# Patient Record
Sex: FEMALE | Race: OTHER | Employment: UNEMPLOYED | URBAN - METROPOLITAN AREA
[De-identification: names, ages, dates, MRNs, and addresses within clinical notes are randomized per-mention and may not be internally consistent; named-entity substitution may affect disease eponyms.]

---

## 2019-12-27 ENCOUNTER — APPOINTMENT (OUTPATIENT)
Dept: ULTRASOUND IMAGING | Age: 29
End: 2019-12-27
Attending: EMERGENCY MEDICINE
Payer: COMMERCIAL

## 2019-12-27 ENCOUNTER — HOSPITAL ENCOUNTER (EMERGENCY)
Age: 29
Discharge: HOME | End: 2019-12-27
Attending: EMERGENCY MEDICINE
Payer: COMMERCIAL

## 2019-12-27 VITALS
WEIGHT: 103 LBS | DIASTOLIC BLOOD PRESSURE: 70 MMHG | HEIGHT: 62 IN | SYSTOLIC BLOOD PRESSURE: 116 MMHG | TEMPERATURE: 98 F | RESPIRATION RATE: 14 BRPM | OXYGEN SATURATION: 100 % | BODY MASS INDEX: 18.95 KG/M2 | HEART RATE: 82 BPM

## 2019-12-27 DIAGNOSIS — R10.13 EPIGASTRIC PAIN: Primary | ICD-10-CM

## 2019-12-27 DIAGNOSIS — A04.8 H. PYLORI INFECTION: ICD-10-CM

## 2019-12-27 LAB
ALBUMIN SERPL-MCNC: 3.9 G/DL (ref 3.4–5)
ALBUMIN/GLOB SERPL: 1 {RATIO} (ref 1–2)
ALP LIVER SERPL-CCNC: 58 U/L (ref 37–98)
ALT SERPL-CCNC: 27 U/L (ref 13–56)
ANION GAP SERPL CALC-SCNC: 8 MMOL/L (ref 0–18)
AST SERPL-CCNC: 15 U/L (ref 15–37)
BASOPHILS # BLD AUTO: 0.07 X10(3) UL (ref 0–0.2)
BASOPHILS NFR BLD AUTO: 0.9 %
BILIRUB SERPL-MCNC: 0.4 MG/DL (ref 0.1–2)
BILIRUB UR QL STRIP.AUTO: NEGATIVE
BUN BLD-MCNC: 10 MG/DL (ref 7–18)
BUN/CREAT SERPL: 18.2 (ref 10–20)
CALCIUM BLD-MCNC: 8.7 MG/DL (ref 8.5–10.1)
CHLORIDE SERPL-SCNC: 109 MMOL/L (ref 98–112)
CO2 SERPL-SCNC: 25 MMOL/L (ref 21–32)
COLOR UR AUTO: YELLOW
CREAT BLD-MCNC: 0.55 MG/DL (ref 0.55–1.02)
DEPRECATED RDW RBC AUTO: 41.1 FL (ref 35.1–46.3)
EOSINOPHIL # BLD AUTO: 0.07 X10(3) UL (ref 0–0.7)
EOSINOPHIL NFR BLD AUTO: 0.9 %
ERYTHROCYTE [DISTWIDTH] IN BLOOD BY AUTOMATED COUNT: 12.5 % (ref 11–15)
GLOBULIN PLAS-MCNC: 3.9 G/DL (ref 2.8–4.4)
GLUCOSE BLD-MCNC: 85 MG/DL (ref 70–99)
GLUCOSE UR STRIP.AUTO-MCNC: NEGATIVE MG/DL
HCT VFR BLD AUTO: 42.6 % (ref 35–48)
HGB BLD-MCNC: 14.3 G/DL (ref 12–16)
IMM GRANULOCYTES # BLD AUTO: 0.01 X10(3) UL (ref 0–1)
IMM GRANULOCYTES NFR BLD: 0.1 %
KETONES UR STRIP.AUTO-MCNC: NEGATIVE MG/DL
LEUKOCYTE ESTERASE UR QL STRIP.AUTO: NEGATIVE
LIPASE SERPL-CCNC: 174 U/L (ref 73–393)
LYMPHOCYTES # BLD AUTO: 2.96 X10(3) UL (ref 1–4)
LYMPHOCYTES NFR BLD AUTO: 37.6 %
M PROTEIN MFR SERPL ELPH: 7.8 G/DL (ref 6.4–8.2)
MCH RBC QN AUTO: 30.4 PG (ref 26–34)
MCHC RBC AUTO-ENTMCNC: 33.6 G/DL (ref 31–37)
MCV RBC AUTO: 90.4 FL (ref 80–100)
MONOCYTES # BLD AUTO: 0.55 X10(3) UL (ref 0.1–1)
MONOCYTES NFR BLD AUTO: 7 %
NEUTROPHILS # BLD AUTO: 4.21 X10 (3) UL (ref 1.5–7.7)
NEUTROPHILS # BLD AUTO: 4.21 X10(3) UL (ref 1.5–7.7)
NEUTROPHILS NFR BLD AUTO: 53.5 %
NITRITE UR QL STRIP.AUTO: NEGATIVE
OSMOLALITY SERPL CALC.SUM OF ELEC: 292 MOSM/KG (ref 275–295)
PH UR STRIP.AUTO: 7.5 [PH] (ref 4.5–8)
PLATELET # BLD AUTO: 228 10(3)UL (ref 150–450)
POCT URINE PREGNANCY: NEGATIVE
POTASSIUM SERPL-SCNC: 3.6 MMOL/L (ref 3.5–5.1)
PROCEDURE CONTROL: NORMAL
PROT UR STRIP.AUTO-MCNC: NEGATIVE MG/DL
RBC # BLD AUTO: 4.71 X10(6)UL (ref 3.8–5.3)
RBC UR QL AUTO: NEGATIVE
SODIUM SERPL-SCNC: 142 MMOL/L (ref 136–145)
SP GR UR STRIP.AUTO: 1.01 (ref 1–1.03)
UROBILINOGEN UR STRIP.AUTO-MCNC: 0.2 MG/DL
WBC # BLD AUTO: 7.9 X10(3) UL (ref 4–11)

## 2019-12-27 PROCEDURE — 99284 EMERGENCY DEPT VISIT MOD MDM: CPT

## 2019-12-27 PROCEDURE — 83690 ASSAY OF LIPASE: CPT | Performed by: EMERGENCY MEDICINE

## 2019-12-27 PROCEDURE — 81003 URINALYSIS AUTO W/O SCOPE: CPT | Performed by: EMERGENCY MEDICINE

## 2019-12-27 PROCEDURE — 80053 COMPREHEN METABOLIC PANEL: CPT | Performed by: EMERGENCY MEDICINE

## 2019-12-27 PROCEDURE — 85025 COMPLETE CBC W/AUTO DIFF WBC: CPT | Performed by: EMERGENCY MEDICINE

## 2019-12-27 PROCEDURE — 96361 HYDRATE IV INFUSION ADD-ON: CPT

## 2019-12-27 PROCEDURE — 76700 US EXAM ABDOM COMPLETE: CPT | Performed by: EMERGENCY MEDICINE

## 2019-12-27 PROCEDURE — 96374 THER/PROPH/DIAG INJ IV PUSH: CPT

## 2019-12-27 PROCEDURE — 81025 URINE PREGNANCY TEST: CPT

## 2019-12-27 RX ORDER — CLARITHROMYCIN 500 MG/1
500 TABLET, COATED ORAL 2 TIMES DAILY
Qty: 28 TABLET | Refills: 0 | Status: SHIPPED | OUTPATIENT
Start: 2019-12-27 | End: 2020-01-10

## 2019-12-27 RX ORDER — ONDANSETRON 2 MG/ML
4 INJECTION INTRAMUSCULAR; INTRAVENOUS ONCE
Status: COMPLETED | OUTPATIENT
Start: 2019-12-27 | End: 2019-12-27

## 2019-12-27 RX ORDER — AMOXICILLIN 500 MG/1
1000 TABLET, FILM COATED ORAL 2 TIMES DAILY
Qty: 30 TABLET | Refills: 0 | Status: SHIPPED | OUTPATIENT
Start: 2019-12-27 | End: 2020-01-10

## 2019-12-28 NOTE — ED INITIAL ASSESSMENT (HPI)
Pt here for RUQ abd pain worse since yesterday. Pt was sent from physicians immediate care to r/o gallbladder but tested positive for h.pylori. Pt reports n/v and worsening pain after eating.

## 2019-12-28 NOTE — ED PROVIDER NOTES
Patient Seen in: [facility] In [place]      History   Patient presents with:  Abdomen/Flank Pain    Stated Complaint: abdominal pain    HPI    Patient is a 49-year-old female who states that for the past year she has had intermittent meningismus. LUNGS: Lungs clear to auscultation bilaterally. CARDIOVASCULAR: + S1-S2, regular rate and rhythm, no murmurs. BACK: No CVA tenderness, no midline bony tenderness.   ABDOMEN: + Bowel sounds, soft, moderate tenderness right upper quadrant, non infection    Disposition:  Discharge  12/27/2019  9:40 pm    Follow-up:  Delmy Fleming, [address] [facility]  [phone]    In 3 days          Medications Prescribed:  Current Discharge Medication List    START taking these

## 2021-11-16 ENCOUNTER — APPOINTMENT (OUTPATIENT)
Dept: ULTRASOUND IMAGING | Age: 31
End: 2021-11-16
Attending: EMERGENCY MEDICINE
Payer: MEDICAID

## 2021-11-16 ENCOUNTER — HOSPITAL ENCOUNTER (EMERGENCY)
Age: 31
Discharge: HOME | End: 2021-11-17
Attending: EMERGENCY MEDICINE
Payer: MEDICAID

## 2021-11-16 DIAGNOSIS — R10.30 LOWER ABDOMINAL PAIN: Primary | ICD-10-CM

## 2021-11-16 PROCEDURE — 81003 URINALYSIS AUTO W/O SCOPE: CPT

## 2021-11-16 PROCEDURE — 81025 URINE PREGNANCY TEST: CPT

## 2021-11-16 PROCEDURE — 87591 N.GONORRHOEAE DNA AMP PROB: CPT | Performed by: EMERGENCY MEDICINE

## 2021-11-16 PROCEDURE — 99285 EMERGENCY DEPT VISIT HI MDM: CPT

## 2021-11-16 PROCEDURE — 76856 US EXAM PELVIC COMPLETE: CPT | Performed by: EMERGENCY MEDICINE

## 2021-11-16 PROCEDURE — 87510 GARDNER VAG DNA DIR PROBE: CPT | Performed by: EMERGENCY MEDICINE

## 2021-11-16 PROCEDURE — 87480 CANDIDA DNA DIR PROBE: CPT | Performed by: EMERGENCY MEDICINE

## 2021-11-16 PROCEDURE — 80053 COMPREHEN METABOLIC PANEL: CPT | Performed by: EMERGENCY MEDICINE

## 2021-11-16 PROCEDURE — 83690 ASSAY OF LIPASE: CPT | Performed by: EMERGENCY MEDICINE

## 2021-11-16 PROCEDURE — 76830 TRANSVAGINAL US NON-OB: CPT | Performed by: EMERGENCY MEDICINE

## 2021-11-16 PROCEDURE — 96361 HYDRATE IV INFUSION ADD-ON: CPT

## 2021-11-16 PROCEDURE — 85025 COMPLETE CBC W/AUTO DIFF WBC: CPT | Performed by: EMERGENCY MEDICINE

## 2021-11-16 PROCEDURE — 87491 CHLMYD TRACH DNA AMP PROBE: CPT | Performed by: EMERGENCY MEDICINE

## 2021-11-16 PROCEDURE — 99284 EMERGENCY DEPT VISIT MOD MDM: CPT

## 2021-11-16 PROCEDURE — 93975 VASCULAR STUDY: CPT | Performed by: EMERGENCY MEDICINE

## 2021-11-16 PROCEDURE — 81003 URINALYSIS AUTO W/O SCOPE: CPT | Performed by: EMERGENCY MEDICINE

## 2021-11-16 PROCEDURE — 96374 THER/PROPH/DIAG INJ IV PUSH: CPT

## 2021-11-16 PROCEDURE — 87660 TRICHOMONAS VAGIN DIR PROBE: CPT | Performed by: EMERGENCY MEDICINE

## 2021-11-16 RX ORDER — SODIUM CHLORIDE 9 MG/ML
125 INJECTION, SOLUTION INTRAVENOUS CONTINUOUS
Status: DISCONTINUED | OUTPATIENT
Start: 2021-11-16 | End: 2021-11-17

## 2021-11-16 RX ORDER — HYDROCODONE BITARTRATE AND ACETAMINOPHEN 5; 325 MG/1; MG/1
1-2 TABLET ORAL EVERY 6 HOURS PRN
Qty: 10 TABLET | Refills: 0 | Status: SHIPPED | OUTPATIENT
Start: 2021-11-16 | End: 2021-11-23

## 2021-11-16 RX ORDER — KETOROLAC TROMETHAMINE 15 MG/ML
15 INJECTION, SOLUTION INTRAMUSCULAR; INTRAVENOUS ONCE
Status: COMPLETED | OUTPATIENT
Start: 2021-11-16 | End: 2021-11-16

## 2021-11-17 VITALS
DIASTOLIC BLOOD PRESSURE: 70 MMHG | SYSTOLIC BLOOD PRESSURE: 100 MMHG | WEIGHT: 108 LBS | HEIGHT: 62 IN | TEMPERATURE: 98 F | BODY MASS INDEX: 19.88 KG/M2 | OXYGEN SATURATION: 99 % | RESPIRATION RATE: 16 BRPM | HEART RATE: 84 BPM

## 2021-11-17 NOTE — ED PROVIDER NOTES
Patient Seen in: [facility] In [place]      History   Patient presents with:  Abdomen/Flank Pain    Stated Complaint: abd pain    Subjective:   HPI  Patient is a 41-year-old female G3, P4 twin gestation.   Patient delivered  1 Physical Exam  GENERAL: Patient resting  on the cart in no acute distress. HEENT: Extraocular muscles intact, pupils equal round reactive to light , neck supple, no meningismus. LUNGS: Lungs clear to auscultation bilaterally.   CARDIOVASCULAR: + S Clinical Impression:  Lower abdominal pain  (primary encounter diagnosis)     Disposition:  Discharge  11/17/2021  2:01 am    Follow-up:  Kaylen Melo, [address] [id number]    In 2 days      Jacqueline Tomlinson DO

## 2021-11-17 NOTE — ED INITIAL ASSESSMENT (HPI)
Pt to ed from home with c/o abd pain, bloated feeling, frequency, urgency feeling,,nausea, sx present 5 days.

## 2025-04-03 ENCOUNTER — HOSPITAL ENCOUNTER (EMERGENCY)
Age: 35
Discharge: HOME | End: 2025-04-03
Attending: EMERGENCY MEDICINE
Payer: COMMERCIAL

## 2025-04-03 ENCOUNTER — APPOINTMENT (OUTPATIENT)
Dept: ULTRASOUND IMAGING | Age: 35
End: 2025-04-03
Attending: PHYSICIAN ASSISTANT
Payer: COMMERCIAL

## 2025-04-03 VITALS
HEIGHT: 62 IN | TEMPERATURE: 98 F | DIASTOLIC BLOOD PRESSURE: 76 MMHG | HEART RATE: 72 BPM | RESPIRATION RATE: 12 BRPM | SYSTOLIC BLOOD PRESSURE: 110 MMHG | BODY MASS INDEX: 20.06 KG/M2 | WEIGHT: 109 LBS | OXYGEN SATURATION: 100 %

## 2025-04-03 DIAGNOSIS — N83.209 HEMORRHAGIC CYST OF OVARY: Primary | ICD-10-CM

## 2025-04-03 LAB
ALBUMIN SERPL-MCNC: 4.9 G/DL (ref 3.2–4.8)
ALBUMIN/GLOB SERPL: 1.7 {RATIO} (ref 1–2)
ALP LIVER SERPL-CCNC: 49 U/L
ALT SERPL-CCNC: 14 U/L
ANION GAP SERPL CALC-SCNC: 8 MMOL/L (ref 0–18)
AST SERPL-CCNC: 17 U/L (ref ?–34)
BASOPHILS # BLD AUTO: 0.05 X10(3) UL (ref 0–0.2)
BASOPHILS NFR BLD AUTO: 0.6 %
BILIRUB SERPL-MCNC: 1 MG/DL (ref 0.3–1.2)
BILIRUB UR QL STRIP.AUTO: NEGATIVE
BUN BLD-MCNC: 10 MG/DL (ref 9–23)
CALCIUM BLD-MCNC: 9.9 MG/DL (ref 8.7–10.6)
CHLORIDE SERPL-SCNC: 106 MMOL/L (ref 98–112)
CLARITY UR REFRACT.AUTO: CLEAR
CO2 SERPL-SCNC: 27 MMOL/L (ref 21–32)
COLOR UR AUTO: YELLOW
CREAT BLD-MCNC: 0.73 MG/DL
EGFRCR SERPLBLD CKD-EPI 2021: 109 ML/MIN/1.73M2 (ref 60–?)
EOSINOPHIL # BLD AUTO: 0.02 X10(3) UL (ref 0–0.7)
EOSINOPHIL NFR BLD AUTO: 0.2 %
ERYTHROCYTE [DISTWIDTH] IN BLOOD BY AUTOMATED COUNT: 12.2 %
GLOBULIN PLAS-MCNC: 2.9 G/DL (ref 2–3.5)
GLUCOSE BLD-MCNC: 93 MG/DL (ref 70–99)
GLUCOSE UR STRIP.AUTO-MCNC: NEGATIVE MG/DL
HCT VFR BLD AUTO: 42.3 %
HGB BLD-MCNC: 14.6 G/DL
IMM GRANULOCYTES # BLD AUTO: 0.01 X10(3) UL (ref 0–1)
IMM GRANULOCYTES NFR BLD: 0.1 %
KETONES UR STRIP.AUTO-MCNC: NEGATIVE MG/DL
LEUKOCYTE ESTERASE UR QL STRIP.AUTO: NEGATIVE
LYMPHOCYTES # BLD AUTO: 2.28 X10(3) UL (ref 1–4)
LYMPHOCYTES NFR BLD AUTO: 28.1 %
MCH RBC QN AUTO: 32.2 PG (ref 26–34)
MCHC RBC AUTO-ENTMCNC: 34.5 G/DL (ref 31–37)
MCV RBC AUTO: 93.2 FL
MONOCYTES # BLD AUTO: 0.55 X10(3) UL (ref 0.1–1)
MONOCYTES NFR BLD AUTO: 6.8 %
NEUTROPHILS # BLD AUTO: 5.19 X10 (3) UL (ref 1.5–7.7)
NEUTROPHILS # BLD AUTO: 5.19 X10(3) UL (ref 1.5–7.7)
NEUTROPHILS NFR BLD AUTO: 64.2 %
NITRITE UR QL STRIP.AUTO: NEGATIVE
OSMOLALITY SERPL CALC.SUM OF ELEC: 291 MOSM/KG (ref 275–295)
PH UR STRIP.AUTO: 5.5 [PH] (ref 5–8)
PLATELET # BLD AUTO: 231 10(3)UL (ref 150–450)
POTASSIUM SERPL-SCNC: 3.6 MMOL/L (ref 3.5–5.1)
PROT SERPL-MCNC: 7.8 G/DL (ref 5.7–8.2)
PROT UR STRIP.AUTO-MCNC: NEGATIVE MG/DL
RBC # BLD AUTO: 4.54 X10(6)UL
RBC UR QL AUTO: NEGATIVE
SODIUM SERPL-SCNC: 141 MMOL/L (ref 136–145)
SP GR UR STRIP.AUTO: <=1.005 (ref 1–1.03)
UROBILINOGEN UR STRIP.AUTO-MCNC: 0.2 MG/DL
WBC # BLD AUTO: 8.1 X10(3) UL (ref 4–11)

## 2025-04-03 PROCEDURE — 85025 COMPLETE CBC W/AUTO DIFF WBC: CPT | Performed by: PHYSICIAN ASSISTANT

## 2025-04-03 PROCEDURE — 80053 COMPREHEN METABOLIC PANEL: CPT | Performed by: PHYSICIAN ASSISTANT

## 2025-04-03 PROCEDURE — 96374 THER/PROPH/DIAG INJ IV PUSH: CPT

## 2025-04-03 PROCEDURE — 76856 US EXAM PELVIC COMPLETE: CPT | Performed by: PHYSICIAN ASSISTANT

## 2025-04-03 PROCEDURE — 99284 EMERGENCY DEPT VISIT MOD MDM: CPT

## 2025-04-03 PROCEDURE — 76830 TRANSVAGINAL US NON-OB: CPT | Performed by: PHYSICIAN ASSISTANT

## 2025-04-03 PROCEDURE — 81003 URINALYSIS AUTO W/O SCOPE: CPT | Performed by: EMERGENCY MEDICINE

## 2025-04-03 RX ORDER — KETOROLAC TROMETHAMINE 15 MG/ML
15 INJECTION, SOLUTION INTRAMUSCULAR; INTRAVENOUS ONCE
Status: COMPLETED | OUTPATIENT
Start: 2025-04-03 | End: 2025-04-03

## 2025-04-03 RX ORDER — GABAPENTIN 100 MG/1
100 CAPSULE ORAL DAILY
Qty: 30 CAPSULE | Refills: 0 | Status: SHIPPED | OUTPATIENT
Start: 2025-04-03

## 2025-04-03 NOTE — ED PROVIDER NOTES
Patient Seen in: [facility] In [place]      History     Chief Complaint   Patient presents with    Abdomen/Flank Pain     Stated Complaint: abd pain- lower pelvis    Subjective:   HPI    34-year-old female who presents to the ER for right lower quadrant abdominal pain That started yesterday and worsening today.  States pains occasionally radiates over to her suprapubic area as well into her lower back and rectal area intermittently.  Denies any other associated symptoms such as nausea, vomiting, diarrhea, constipation, fever, urinary symptoms.  She reports past surgical history of hysterectomy from endometriosis as well as left oophorectomy due to cysts as well as appendectomy.  She has not taken anything for the pain prior to arrival.  No other complaints.    Objective:     Past Medical History:    History of cervical cancer              Past Surgical History:   Procedure Laterality Date    Appendectomy            x 2    Tonsillectomy      Total abdom hysterectomy                  Social History     Socioeconomic History    Marital status: Single   Tobacco Use    Smoking status: Former    Smokeless tobacco: Never   Vaping Use    Vaping status: Never Used   Substance and Sexual Activity    Alcohol use: No    Drug use: No     Social Drivers of Health      Received from [facility]                  Physical Exam     ED Triage Vitals [25 0955]   /90   Pulse 109   Resp 18   Temp 98.3 °F (36.8 °C)   Temp src Temporal   SpO2 100 %   O2 Device None (Room air)       Current Vitals:   Vital Signs  BP: 110/76  Pulse: 72  Resp: 12  Temp: 98.3 °F (36.8 °C)  Temp src: Temporal    Oxygen Therapy  SpO2: 100 %  O2 Device: None (Room air)        Physical Exam  GENERAL APPEARANCE:  AxOx4, generally well-appearing, no acute distress.  HEENT:  NC, AT. MMM. EOMI, clear conjunctiva, oropharynx clear.  NECK:  Supple without lymphadenopathy.  No stiffness or restricted ROM.  HEART:   Normal rate and regular rhythm, normal S1/S1, no m/r/g  LUNGS:  CTAB, moving air well. No crackles or wheezes are heard.  ABDOMEN:  Soft, right lower quadrant and suprapubic tenderness, nondistended with good bowel sounds heard.  BACK: No CVAT, no obvious deformity.  EXTREMITIES:  Without cyanosis, clubbing or edema.  NEUROLOGICAL:  Grossly nonfocal. Alert and oriented, moving all 4 extremities. CN not formally tested but appear grossly intact. Observed to ambulate with normal gait.  Skin:  Warm and dry without any rash.        ED Course     Labs Reviewed   COMP METABOLIC PANEL (14) - Abnormal; Notable for the following components:       Result Value    Albumin 4.9 (*)     All other components within normal limits   URINALYSIS, ROUTINE   CBC WITH DIFFERENTIAL WITH PLATELET   RAINBOW DRAW LAVENDER   RAINBOW DRAW LIGHT GREEN            US PELVIS (TRANSABDOMINAL AND TRANSVAGINAL) (CPT=76856/90052)    Result Date: 4/3/2025  PROCEDURE:  US PELVIS (TRANSABDOMINAL AND TRANSVAGINAL) (CPT=76856/28288)  COMPARISON:  [facility], US PELVIS EV W DOPPLER (CPT=76856/99274/05696), 11/16/2021, 10:26 PM.  INDICATIONS:  abd pain- lower pelvis  TECHNIQUE:  Pelvic ultrasound using transabdominal and endovaginal technique.  Transvaginal ultrasound was used to better evaluate adnexal and endometrial detail.  PATIENT STATED HISTORY: (As transcribed by Technologist)  Right pelvic pain since yesterday.    FINDINGS:            UTERUS:  Patient is status post hysterectomy.  No evidence of mass lesion within the hysterectomy bed.  RIGHT OVARY:  3.4 x 2.3 x 3.5 cm   The right ovary appears normal in size, shape, and echogenicity. No significant masses are identified.  Several physiologic follicles are noted with the largest being isoechoic with a few internal septations measuring 16 x 14 x 13 mm likely representing a hemorrhagic cyst. LEFT OVARY:  Left ovary was removed.  CUL-DE-SAC:  Normal.  No fluid or mass.  OTHER:  There is normal  arterial and venous Doppler waveform involving the right ovary.             CONCLUSION:   1. Several follicles on the right ovary with the largest likely representing a hemorrhagic cyst measuring 16 x 14 x 13 mm.  2. Status post hysterectomy and left oophorectomy.    LOCATION:  [facility]   Dictated by (CST): Gerald Simons MD on 4/03/2025 at 10:54 AM     Finalized by (CST): Gerald Simons MD on 4/03/2025 at 10:57 AM             MDM      34-year-old female who presents to the ER for right lower quadrant abdominal pain.  Vitals with tachycardia to 109 but otherwise within normal limits.  Differential diagnosis includes but does not exclude right ovarian cyst versus torsion versus diverticulitis versus UTI.  CBC, CMP and UA all normal and reassuring.  Ultrasound pelvis reveals likely hemorrhagic cyst of the right side.  Patient feeling a little better with pain medications given here.  Discussed continued care at home as well as need for follow-up with gynecology.  Discussed return precautions.  They understand and agree with plan ready for discharge.  Patient also seen and evaluated by Dr. Poole.         Medical Decision Making      Disposition and Plan     Clinical Impression:  1. Hemorrhagic cyst of ovary         Disposition:  Discharge  4/3/2025 11:55 am    Follow-up:  Your Gynecologist    Follow up            Medications Prescribed:  Discharge Medication List as of 4/3/2025 11:55 AM        START taking these medications    Details   gabapentin 100 MG Oral Cap Take 1 capsule (100 mg total) by mouth daily., Normal, Disp-30 capsule, R-0                 Supplementary Documentation:

## 2025-04-03 NOTE — DISCHARGE INSTRUCTIONS
You can take Tylenol 500 mg every 4-6 hours or as needed and ibuprofen 600 mg every 6 hours or as needed.  You can also try taking gabapentin 100 mg daily but can take up to 300 mg total if needed and if tolerated.  Use with caution as it can make you drowsy.  Make an appointment for follow-up with your gynecologist.  Return to the ER for any other new or worsening symptoms

## 2025-04-03 NOTE — ED INITIAL ASSESSMENT (HPI)
Rlq abd pain that started mild yesterday that has gotten worse.  States she only has her one right ovary and does not have her appendix.  Reports nausea with pain.  States pain is now in her kayden and rectum

## (undated) NOTE — ED AVS SNAPSHOT
Tanja Davida   MRN: [id number]    Department:  [facility] in [place]   Date of Visit:  12/27/2019           Disclosure     Insurance plans vary and the physician(s) referred by the ER may not be covered by your plan.  Please contact tell this physician (or your personal doctor if your instructions are to return to your personal doctor) about any new or lasting problems. The primary care or specialist physician will see patients referred from the [facility] Emergency Department.  Bhakti Kumar